# Patient Record
Sex: MALE | Race: WHITE
[De-identification: names, ages, dates, MRNs, and addresses within clinical notes are randomized per-mention and may not be internally consistent; named-entity substitution may affect disease eponyms.]

---
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
## 2025-06-15 NOTE — NUR
DR ROCK REMOVED GAUZE DRESSING AND PACKING FROM MIDLINE ABDOMINAL INCISION. 
FOUL SMELL LIKE STOOL, DR ROCK AGREED



DR ROCK PLANS TO ADMIT OVERNIGHT AND DO SURGERY IN THE AM No care due was identified.  [facility] Embedded Care Due Messages. Reference number: [id number].   6/15/2025 2:36:44 PM CDT